# Patient Record
Sex: MALE | Race: WHITE | URBAN - METROPOLITAN AREA
[De-identification: names, ages, dates, MRNs, and addresses within clinical notes are randomized per-mention and may not be internally consistent; named-entity substitution may affect disease eponyms.]

---

## 2017-09-19 ENCOUNTER — EMERGENCY (EMERGENCY)
Facility: HOSPITAL | Age: 72
LOS: 1 days | Discharge: HOME | End: 2017-09-19
Admitting: EMERGENCY MEDICINE
Payer: MEDICARE

## 2017-09-19 VITALS
TEMPERATURE: 99 F | SYSTOLIC BLOOD PRESSURE: 132 MMHG | HEART RATE: 99 BPM | DIASTOLIC BLOOD PRESSURE: 76 MMHG | RESPIRATION RATE: 18 BRPM | OXYGEN SATURATION: 97 %

## 2017-09-19 DIAGNOSIS — Z98.890 OTHER SPECIFIED POSTPROCEDURAL STATES: Chronic | ICD-10-CM

## 2017-09-19 PROCEDURE — 99283 EMERGENCY DEPT VISIT LOW MDM: CPT

## 2017-09-19 RX ORDER — ATORVASTATIN CALCIUM 80 MG/1
0 TABLET, FILM COATED ORAL
Qty: 0 | Refills: 0 | COMMUNITY

## 2017-09-19 RX ORDER — RANITIDINE HYDROCHLORIDE 150 MG/1
0 TABLET, FILM COATED ORAL
Qty: 0 | Refills: 0 | COMMUNITY

## 2017-09-19 RX ORDER — RIBOFLAVIN (VITAMIN B2) 25 MG
0 TABLET ORAL
Qty: 0 | Refills: 0 | COMMUNITY

## 2017-09-19 RX ORDER — PREGABALIN 225 MG/1
0 CAPSULE ORAL
Qty: 0 | Refills: 0 | COMMUNITY

## 2017-09-19 NOTE — ED PROVIDER NOTE - PHYSICAL EXAMINATION
Gen - WDWN M, NAD, comfortable in stretcher, non-toxic appearing, speaking in full sentences   Skin - warm, dry, intact  HEENT - AT/NC, PERRL, EOMI, no conjunctival injection, o/p clear with no erythema, edema, or exudate, uvula midline, airway patent, neck supple, no JVD or carotid bruits b/l, no palpable nodes   CV - S1S2, R/R/R  Resp - respiration non-labored, CTAB, symmetric bs b/l, no r/r/w  GI - NABS, soft, ND, NT, no rebound or guarding, no CVAT b/l   MS - w/w/p, no c/c/e, calves supple and NT, distal pulses symmetric b/l   Neuro - AxOx3, no focal neuro deficits, CN II-XII grossly intact, cerebellar function intact, ambulatory without gait disturbance

## 2017-09-19 NOTE — ED PROVIDER NOTE - MEDICAL DECISION MAKING DETAILS
pt was sent in by [facility] for change in behaviors from baseline today.  Pt didn't want to come in, reports no active sx.  Was only experiencing problems using his cellphone today but not due to confusion, AxOx4 in the ED and answering questions appropriately, coherent and conversive with no focal findings on exam, FS wnl, was only trying to troubleshoot his phone, medically cleared for d/c pt was sent in by [facility] for change in behaviors from baseline today.  Pt didn't want to come in, reports no active sx.  Was only experiencing problems using his cellphone today but not due to confusion, AxOx4 in the ED and answering questions appropriately, coherent and conversant with no focal findings on exam, FS wnl, was only trying to troubleshoot his phone, medically cleared for d/c

## 2017-09-19 NOTE — ED ADULT TRIAGE NOTE - CHIEF COMPLAINT QUOTE
isha, [facility] staff thought he was confused because he kept turning his cellphone off and on. isha, lives at [facility] ([facility]), staff thought he was confused because he kept turning his cellphone off and on.  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

## 2017-09-19 NOTE — ED ADULT NURSE NOTE - CHIEF COMPLAINT QUOTE
isha, lives at shelter ([facility]), staff thought he was confused because he kept turning his cellphone off and on.  addendum; spoke with  from 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

## 2017-09-19 NOTE — ED ADULT NURSE REASSESSMENT NOTE - NS ED NURSE REASSESS COMMENT FT1
patient refusing to wait for taxi ride derek, states he has his subway card and takes the subway home, HEAVEN Haque aware, patient ambulatory to the subway on discharge

## 2017-09-19 NOTE — ED PROVIDER NOTE - OBJECTIVE STATEMENT
71 yo M with PMHx of HLD, [facility] of ?[facility]. Pt reports no active sx, but SW on site insisted to sent pt in for evaluation due to "strange behaviors" today.  Pt was found to be having difficulty using his cellphone today and asked doorman for key to his room while finding keys in his pocket later on. Per transfer note, pt was off his baseline behaviors.  However, pt does not reports any sx.  States that he was trying to send an article from a website to his friend but his phone was not working appropriately.  Denies fever, chills, HA, dizziness, SI/HI/AH/VH/delusion, mood swings, anxiety, CP, SOB, palpitations, N/V/D/C, abdominal pain, change in urinary/bowel function, tremors, focal weakness, and fever/chills.  No change in meds noted

## 2017-09-23 DIAGNOSIS — E78.00 PURE HYPERCHOLESTEROLEMIA, UNSPECIFIED: ICD-10-CM

## 2017-09-23 DIAGNOSIS — Z00.00 ENCOUNTER FOR GENERAL ADULT MEDICAL EXAMINATION WITHOUT ABNORMAL FINDINGS: ICD-10-CM

## 2017-09-23 DIAGNOSIS — E78.5 HYPERLIPIDEMIA, UNSPECIFIED: ICD-10-CM

## 2017-09-23 DIAGNOSIS — Z79.899 OTHER LONG TERM (CURRENT) DRUG THERAPY: ICD-10-CM
